# Patient Record
Sex: MALE | Race: ASIAN | NOT HISPANIC OR LATINO | Employment: FULL TIME | URBAN - METROPOLITAN AREA
[De-identification: names, ages, dates, MRNs, and addresses within clinical notes are randomized per-mention and may not be internally consistent; named-entity substitution may affect disease eponyms.]

---

## 2017-10-03 ENCOUNTER — OFFICE VISIT - HEALTHEAST (OUTPATIENT)
Dept: FAMILY MEDICINE | Facility: CLINIC | Age: 22
End: 2017-10-03

## 2017-10-03 DIAGNOSIS — L20.9 ATOPIC DERMATITIS: ICD-10-CM

## 2019-06-05 ENCOUNTER — OFFICE VISIT - HEALTHEAST (OUTPATIENT)
Dept: FAMILY MEDICINE | Facility: CLINIC | Age: 24
End: 2019-06-05

## 2019-06-05 DIAGNOSIS — H01.133 ECZEMA OF RIGHT EYELID: ICD-10-CM

## 2019-06-05 DIAGNOSIS — L84 CALLUS OF FOOT: ICD-10-CM

## 2019-06-05 DIAGNOSIS — Z71.85 IMMUNIZATION COUNSELING: ICD-10-CM

## 2019-06-05 RX ORDER — HYDROCORTISONE 25 MG/G
OINTMENT TOPICAL
Qty: 30 G | Refills: 0 | Status: SHIPPED | OUTPATIENT
Start: 2019-06-05

## 2019-06-05 ASSESSMENT — MIFFLIN-ST. JEOR: SCORE: 1840.4

## 2020-12-17 ENCOUNTER — OFFICE VISIT - HEALTHEAST (OUTPATIENT)
Dept: FAMILY MEDICINE | Facility: CLINIC | Age: 25
End: 2020-12-17

## 2020-12-17 DIAGNOSIS — Z13.1 SCREENING FOR DIABETES MELLITUS: ICD-10-CM

## 2020-12-17 DIAGNOSIS — Z13.220 SCREENING FOR LIPID DISORDERS: ICD-10-CM

## 2020-12-17 DIAGNOSIS — Z00.00 WELL ADULT EXAM: ICD-10-CM

## 2020-12-17 DIAGNOSIS — Z11.1 SCREENING EXAMINATION FOR PULMONARY TUBERCULOSIS: ICD-10-CM

## 2020-12-17 DIAGNOSIS — Z13.21 ENCOUNTER FOR VITAMIN DEFICIENCY SCREENING: ICD-10-CM

## 2020-12-17 DIAGNOSIS — Z13.0 SCREENING FOR DEFICIENCY ANEMIA: ICD-10-CM

## 2020-12-17 DIAGNOSIS — Z20.9 EXPOSURE TO POTENTIAL INFECTION: ICD-10-CM

## 2020-12-17 DIAGNOSIS — Z01.84 IMMUNITY STATUS TESTING: ICD-10-CM

## 2020-12-17 DIAGNOSIS — Z23 NEED FOR VACCINATION: ICD-10-CM

## 2020-12-17 DIAGNOSIS — J02.9 SORE THROAT: ICD-10-CM

## 2020-12-17 DIAGNOSIS — R09.81 NASAL CONGESTION: ICD-10-CM

## 2020-12-17 LAB
DEPRECATED S PYO AG THROAT QL EIA: NORMAL
FLUAV AG SPEC QL IA: NORMAL
FLUBV AG SPEC QL IA: NORMAL

## 2020-12-17 ASSESSMENT — MIFFLIN-ST. JEOR: SCORE: 1859.68

## 2020-12-18 ENCOUNTER — COMMUNICATION - HEALTHEAST (OUTPATIENT)
Dept: INTERNAL MEDICINE | Facility: CLINIC | Age: 25
End: 2020-12-18

## 2020-12-18 LAB — GROUP A STREP BY PCR: NORMAL

## 2020-12-21 ENCOUNTER — COMMUNICATION - HEALTHEAST (OUTPATIENT)
Dept: INTERNAL MEDICINE | Facility: CLINIC | Age: 25
End: 2020-12-21

## 2021-02-17 ENCOUNTER — AMBULATORY - HEALTHEAST (OUTPATIENT)
Dept: NURSING | Facility: CLINIC | Age: 26
End: 2021-02-17

## 2021-02-17 ENCOUNTER — AMBULATORY - HEALTHEAST (OUTPATIENT)
Dept: LAB | Facility: CLINIC | Age: 26
End: 2021-02-17

## 2021-02-17 DIAGNOSIS — Z01.84 IMMUNITY STATUS TESTING: ICD-10-CM

## 2021-02-17 DIAGNOSIS — Z13.21 ENCOUNTER FOR VITAMIN DEFICIENCY SCREENING: ICD-10-CM

## 2021-02-17 DIAGNOSIS — Z11.1 SCREENING EXAMINATION FOR PULMONARY TUBERCULOSIS: ICD-10-CM

## 2021-02-17 DIAGNOSIS — Z13.220 SCREENING FOR LIPID DISORDERS: ICD-10-CM

## 2021-02-17 DIAGNOSIS — Z23 NEED FOR VACCINATION: ICD-10-CM

## 2021-02-17 DIAGNOSIS — Z20.9 EXPOSURE TO POTENTIAL INFECTION: ICD-10-CM

## 2021-02-17 DIAGNOSIS — Z00.00 WELL ADULT EXAM: ICD-10-CM

## 2021-02-17 LAB
ALBUMIN SERPL-MCNC: 4.3 G/DL (ref 3.5–5)
ALP SERPL-CCNC: 80 U/L (ref 45–120)
ALT SERPL W P-5'-P-CCNC: 19 U/L (ref 0–45)
ANION GAP SERPL CALCULATED.3IONS-SCNC: 8 MMOL/L (ref 5–18)
AST SERPL W P-5'-P-CCNC: 19 U/L (ref 0–40)
BILIRUB SERPL-MCNC: 0.5 MG/DL (ref 0–1)
BUN SERPL-MCNC: 20 MG/DL (ref 8–22)
CALCIUM SERPL-MCNC: 9 MG/DL (ref 8.5–10.5)
CHLORIDE BLD-SCNC: 105 MMOL/L (ref 98–107)
CHOLEST SERPL-MCNC: 132 MG/DL
CO2 SERPL-SCNC: 26 MMOL/L (ref 22–31)
CREAT SERPL-MCNC: 0.99 MG/DL (ref 0.7–1.3)
ERYTHROCYTE [DISTWIDTH] IN BLOOD BY AUTOMATED COUNT: 13.7 % (ref 11–14.5)
FASTING STATUS PATIENT QL REPORTED: NORMAL
GFR SERPL CREATININE-BSD FRML MDRD: >60 ML/MIN/1.73M2
GLUCOSE BLD-MCNC: 98 MG/DL (ref 70–125)
HCT VFR BLD AUTO: 46.3 % (ref 40–54)
HDLC SERPL-MCNC: 43 MG/DL
HGB BLD-MCNC: 14.7 G/DL (ref 14–18)
LDLC SERPL CALC-MCNC: 71 MG/DL
MCH RBC QN AUTO: 25.2 PG (ref 27–34)
MCHC RBC AUTO-ENTMCNC: 31.7 G/DL (ref 32–36)
MCV RBC AUTO: 79 FL (ref 80–100)
PLATELET # BLD AUTO: 232 THOU/UL (ref 140–440)
PMV BLD AUTO: 9.4 FL (ref 7–10)
POTASSIUM BLD-SCNC: 4.1 MMOL/L (ref 3.5–5)
PROT SERPL-MCNC: 7.7 G/DL (ref 6–8)
RBC # BLD AUTO: 5.84 MILL/UL (ref 4.4–6.2)
SODIUM SERPL-SCNC: 139 MMOL/L (ref 136–145)
TRIGL SERPL-MCNC: 91 MG/DL
WBC: 5.1 THOU/UL (ref 4–11)

## 2021-02-18 LAB
25(OH)D3 SERPL-MCNC: 16.3 NG/ML (ref 30–80)
HEPATITIS B SURFACE ANTIBODY LHE- HISTORICAL: POSITIVE

## 2021-02-19 LAB
GAMMA INTERFERON BACKGROUND BLD IA-ACNC: 0.06 IU/ML
M TB IFN-G BLD-IMP: NEGATIVE
MITOGEN IGNF BCKGRD COR BLD-ACNC: -0.01 IU/ML
MITOGEN IGNF BCKGRD COR BLD-ACNC: -0.01 IU/ML
QTF INTERPRETATION: NORMAL
QTF MITOGEN - NIL: 7.34 IU/ML
VZV IGG SER QL IA: POSITIVE

## 2021-02-23 ENCOUNTER — COMMUNICATION - HEALTHEAST (OUTPATIENT)
Dept: SCHEDULING | Facility: CLINIC | Age: 26
End: 2021-02-23

## 2021-02-27 ENCOUNTER — COMMUNICATION - HEALTHEAST (OUTPATIENT)
Dept: FAMILY MEDICINE | Facility: CLINIC | Age: 26
End: 2021-02-27

## 2021-03-11 ENCOUNTER — COMMUNICATION - HEALTHEAST (OUTPATIENT)
Dept: INTERNAL MEDICINE | Facility: CLINIC | Age: 26
End: 2021-03-11

## 2021-03-12 ENCOUNTER — AMBULATORY - HEALTHEAST (OUTPATIENT)
Dept: NURSING | Facility: CLINIC | Age: 26
End: 2021-03-12

## 2021-03-19 ENCOUNTER — AMBULATORY - HEALTHEAST (OUTPATIENT)
Dept: NURSING | Facility: CLINIC | Age: 26
End: 2021-03-19

## 2021-05-29 NOTE — PROGRESS NOTES
"Assessment & Plan   1. Eczema of right eyelid  Well defined erythematous patch of lower lid that appears consistent with eczema.  H/o eczema though has not previously experienced symptoms on the eyelids.  Will treat with short course of topical corticosteroid and advised on application to avoid contact with eye. If no improvement in two weeks or if he finds that he is experiencing recurrent flares of eczema on the eyelids would consider calcineurin inhibitor.    - hydrocortisone 2.5 % ointment; Apply twice daily for up to weeks  Dispense: 30 g; Refill: 0    2. Callus of foot  Appearance consistent with calluses.  Likely from rubbing on tops of shoes. Recommended soaking, exfoliating, thick emollient daily.     3. Immunization counseling  Update HPV, advised to return in six months for #3.  Hep A #2 to finish this series.   - HPV vaccine 9 valent 3 dose IM  - Hepatitis A adult 2 dose IM (19 yr & older)    Indy Banerjee CNP    Subjective   Chief Complaint:  Eczema (under R eye and bilateral feet x 3 years )    HPI:   Jan Parish is a 23 y.o. male who presents for skin concern.      R eye: He states redness and roughness began about one month ago.  Tried using Vaseline though it continued to enlarge in size, now mildly swollen.  Not pruritic. Does touch it frequently.  No drainage.  H/o eczema on arms, never on face.     Feet: Bilateral great toes with thickened skin on dorsal aspect near PIP joint.       Allergies:  has No Known Allergies.    SH/FH:  Social History and Family History reviewed and updated.   Tobacco Status:  He  reports that he has never smoked. He has never used smokeless tobacco.    Review of Systems:  A complete head to toe ROS is negative unless otherwise noted in HPI    Objective     Vitals:    06/05/19 1133   BP: 94/60   Patient Site: Left Arm   Patient Position: Sitting   Cuff Size: Adult Large   Pulse: 64   Weight: 193 lb 12 oz (87.9 kg)   Height: 5' 7.5\" (1.715 m)       Physical " Exam:  GENERAL: Alert, well-appearing male .   SKIN: Right lower eyelid with 5mm x 3mm well defined erythematous patch. Fine scaling. Slightly edematous.  Nontender to palpation.  No surrounding erythema.  Conjunctiva clear. EOMs intact. PERRLA.   Bilateral great toes with 1cm plaques of thickened skin on dorsal aspects.  No flaking, erythema, drainage.  Fairly well defined.

## 2021-05-29 NOTE — PATIENT INSTRUCTIONS - HE
Recommendations from today's visit                                                       1. For the eye, we will use hydrocortisone 2.5% ointment twice daily for up to two weeks.  Then Vaseline to keep the area moisturized and prevent future outbreaks.  If you find that this is happening frequently, please let me know and we would consider another type of treatment.     2. The areas on your toes are calluses from contact with your shoes.  Soak in warm water for 10-15 minutes and then exfoliate with pumice stone or cliff board    3. We will give you your second HPV vaccine today.  You can return in six months for your third vaccine.     Next appointment: six months.     To reschedule your appointment, please call the clinic directly at [phone].   It was a pleasure seeing you today! I look forward to seeing you again.

## 2021-05-31 VITALS — WEIGHT: 173.25 LBS

## 2021-06-02 VITALS — BODY MASS INDEX: 29.36 KG/M2 | HEIGHT: 68 IN | WEIGHT: 193.75 LBS

## 2021-06-05 VITALS
DIASTOLIC BLOOD PRESSURE: 64 MMHG | OXYGEN SATURATION: 98 % | WEIGHT: 198 LBS | SYSTOLIC BLOOD PRESSURE: 108 MMHG | HEIGHT: 68 IN | HEART RATE: 94 BPM | BODY MASS INDEX: 30.01 KG/M2

## 2021-06-13 NOTE — TELEPHONE ENCOUNTER
----- Message from Moe Mercedes MD sent at 12/18/2020  5:37 PM CST -----  Inform [place]   Strep and Flu Normal  DanL

## 2021-06-13 NOTE — PROGRESS NOTES
Assessment & Plan   1. Atopic dermatitis: Appearance consistent with eczematous rash, KOH negative.  Discussed hypopigmentation is likely postinflammatory reaction.  Will treat topically with Lidex cream, advised on limiting use to this area.   - KOH Prep  - fluocinonide (LIDEX) 0.05 % cream; Use twice daily on affected area for up to two weeks  Dispense: 15 g; Refill: 0    Indy Banerjee CNP    Subjective   Chief Complaint:  Rash (left elbow rash, pain x1 year)    HPI:   Jan Parish is a 22 y.o. male who presents for evaluation of rash on left elbow.  He is a new patient to the clinic. No significant PMH.     He complains of rash on his left elbow that has been present for over a year.  Does improve and worsen at times.  Pruritic. Not painful.  Has noted hypopigmentation over the past six months.  No history of previous skin conditions. No lesions elsewhere on body.  No known exposures.  He uses a daily lotion, unsure of type.     PMH:   There is no problem list on file for this patient.      No past medical history on file.    Current Medications:   No current outpatient prescriptions on file prior to visit.     No current facility-administered medications on file prior to visit.        Allergies:  has No Known Allergies.    SH/FH:  Social History and Family History reviewed and updated.   Tobacco Status:  He  reports that he has never smoked. He has never used smokeless tobacco.    Review of Systems:  A complete head to toe ROS is negative unless otherwise noted in HPI    Objective     Vitals:    10/03/17 1333   BP: 108/62   Patient Site: Right Arm   Patient Position: Sitting   Cuff Size: Adult Regular   Pulse: 70   Weight: 173 lb 4 oz (78.6 kg)     Wt Readings from Last 3 Encounters:   10/03/17 173 lb 4 oz (78.6 kg)       Physical Exam:  GENERAL: Alert, well-appearing male  SKIN: Extensor surface of left elbow with 3cm round area of hypopigmentation.  Mildly erythematous, maculopapular, excoriations present. No  drainage or crusting. No lesions elsewhere on body    Labs:    No results found for this or any previous visit (from the past 168 hour(s)).

## 2021-06-13 NOTE — PATIENT INSTRUCTIONS - HE
Great to see you / meet you today Harmans!    Say isaac to your family!!     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    Sonu

## 2021-06-13 NOTE — TELEPHONE ENCOUNTER
Forms Request  Name of form/paperwork: Other:  [employer] Physical Examination Form  Have you been seen for this request: Yes:  12/17/2020  Do we have the form: Yes- Pt dropped the form off - placed in Dr. Mercedes's bin at   When is form needed by: as soon as feasible   How would you like the form returned:   Patient Notified form requests are processed in 3-5 business days: Yes    Okay to leave a detailed message? Yes

## 2021-06-13 NOTE — PROGRESS NOTES
ASSESSMENT & PLAN    Jan is here for physical examination  Going to nursing school  Needs screening exams  I reviewed his immunizations he has not had MMR  He has no documented to varicella    Check immune status for varicella and hepatitis B    He will receive MMR we will repeat this in 1 month to 2 months          No problem-specific Assessment & Plan notes found for this encounter.      Jan was seen today for annual exam.    Diagnoses and all orders for this visit:    Well adult exam    Need for vaccination  -     Tdap vaccine,  8yo or older,  IM    Nasal congestion  -     Influenza A/B Rapid Test    Sore throat  -     Rapid Strep A Screen-Throat    Exposure to potential infection  -     COVID-19 Virus (Coronavirus) Antibody & Titer Reflex    Screening for lipid disorders  -     Lipid Cascade RANDOM    Encounter for vitamin deficiency screening  -     Vitamin D, Total (25-Hydroxy)    Screening for diabetes mellitus  -     Comprehensive Metabolic Panel    Screening for deficiency anemia  -     HM2(CBC w/o Differential)    Screening examination for pulmonary tuberculosis  -     QTF-Mycobacterium tuberculosis by QuantiFERON-TB Gold Plus    Immunity status testing  -     Varicella Zoster Antibody, IgG  -     Hepatitis B Surface Antibody (Anti-HBs) Vaccine Check    Other orders  -     MMR vaccine subcutaneous  -     MMR vaccine subcutaneous; Future        Patient Instructions   Great to see you / meet you today Jan!    Say isaac to your family!!     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    Sonu      Return in about 2 months (around 2/17/2021).       Little interest or pleasure in doing things: Not at all  Feeling down, depressed, or hopeless: Not at all    CHIEF COMPLAINT: Jan Parish had concerns including Annual Exam (pt not fasting).    [place]: 1.............. SUBJECTIVE:  Jan Parish is a 25 y.o. male had concerns including Annual Exam (pt not fasting).    1. Well adult exam    2. Need for vaccination    3. Nasal congestion    4. Sore throat    5. Exposure to potential infection    6. Screening for lipid disorders    7. Encounter for vitamin deficiency screening    8. Screening for diabetes mellitus    9. Screening for deficiency anemia    10. Screening examination for pulmonary tuberculosis    11. Immunity status testing          No Known Allergies                      SOCIAL: He  reports that he has never smoked. He has never used smokeless tobacco. He reports that he does not drink alcohol or use drugs.    REVIEW OF SYSTEMS:   Family history not pertinent to chief complaint or presenting problem    Review of systems otherwise negative as requested from patient, except   Those positive ROS outlined and discussed in [place].      VITALS:  Vitals:    12/17/20 1732   BP: 108/64   Pulse: 94   SpO2: 98%   Weight: 198 lb (89.8 kg)   Height: 5' 7.5\" (1.715 m)     Wt Readings from Last 3 Encounters:   12/17/20 198 lb (89.8 kg)   06/05/19 193 lb 12 oz (87.9 kg)   10/03/17 173 lb 4 oz (78.6 kg)     Body mass index is 30.55 kg/m .    Physical Exam:      Physical:  General Appearance: Healthy-appearingy.   Head:  No deformity  Eyes: Sclerae white, pupils equal and reactive, extra ocular movements intact   Ears: Well-positioned, well-formed pinnae; TM pearly white, translucent, no bulging   Nose: Clear, normal mucosa no drainage or crusting  Throat: " Lips, tongue, and mucosa are moist, pink and intact; tongue no thrush oral pharynx no injection or lesions  Neck: Supple, symmetric ROM no nodes   No carotid Buits  Chest: Lungs clear to auscultation, no retractions  Heart: Regular rate & rhythm, S1 S2, no murmur  Abdomen: Soft, non-tender, no masses; umbilical area normal   Pulses: Equal femoral pulses  : No hernia palpable   Extremities: Well-perfused, warm and dry, No Edema  Palpable Pulses Bilateral  Neuro: Easily aroused good tone NO tremor   Skin  No Rash dry skin  Hint of acanthosis of the neck           I spent 40 minutes with this patient.  This includes pre-visit, intra-visit and post visit work an evaluation with regards to [place] was seen today for annual exam.    Diagnoses and all orders for this visit:    Well adult exam    Need for vaccination  -     Tdap vaccine,  6yo or older,  IM    Nasal congestion  -     Influenza A/B Rapid Test    Sore throat  -     Rapid Strep A Screen-Throat    Exposure to potential infection  -     COVID-19 Virus (Coronavirus) Antibody & Titer Reflex    Screening for lipid disorders  -     Lipid Cascade RANDOM    Encounter for vitamin deficiency screening  -     Vitamin D, Total (25-Hydroxy)    Screening for diabetes mellitus  -     Comprehensive Metabolic Panel    Screening for deficiency anemia  -     HM2(CBC w/o Differential)    Screening examination for pulmonary tuberculosis  -     QTF-Mycobacterium tuberculosis by QuantiFERON-TB Gold Plus    Immunity status testing  -     Varicella Zoster Antibody, IgG  -     Hepatitis B Surface Antibody (Anti-HBs) Vaccine Check    Other orders  -     MMR vaccine subcutaneous  -     MMR vaccine subcutaneous; Future        Moe Mercedes MD  Family [facility] [address]  [phone]

## 2021-06-13 NOTE — TELEPHONE ENCOUNTER
Second attempt:    Left message to call back for: Patient   Information to relay to patient:  Inform [place]   Strep and Flu Normal     GJ/RMA

## 2021-06-14 NOTE — TELEPHONE ENCOUNTER
Reason for Call:  Other Form     Detailed comments: Pt calling to check and see if forms were received via fax. Per notes below, doesn't not seem like the clinic did. I asked him which # was the form faxed to and he said [phone]. Not sure where that goes but I gave him [facility] fax # [phone]. Asked his to have the school nurse fax it to that # instead and CMA's should get it then. Please call him to confirm once received.    Phone Number Patient can be reached at:   Cell number on file:    Telephone Information:   Mobile [phone]       Best Time: anytime    Can we leave a detailed message on this number?: Yes    Call taken on 1/5/2021 at 11:51 AM by Quentin Patton

## 2021-06-14 NOTE — TELEPHONE ENCOUNTER
Vandana spoke with him, we have not received this form\    Nicole Hall CMA ([facility])

## 2021-06-14 NOTE — TELEPHONE ENCOUNTER
Patient Returning Call  Reason for call:  Patient calling back for test results  Information relayed to patient:  Message below  Patient has additional questions:  No  If YES, what are your questions/concerns:  n/a  Okay to leave a detailed message?: No call back needed

## 2021-06-14 NOTE — TELEPHONE ENCOUNTER
Checked with [facility], that is the  fax number, but they have not seen it    Nicole Hall CMA ([facility])

## 2021-06-14 NOTE — TELEPHONE ENCOUNTER
Form signed and faxed back.  I called and informed him of this    Nicole Hall CMA ([facility])

## 2021-06-15 NOTE — TELEPHONE ENCOUNTER
Pt needs a second varicella shot for school, first dose was 2010 so school wants him to show proof of second vaccine.

## 2021-06-21 NOTE — LETTER
Letter by Moe Mercedes MD at      Author: Moe Mercedes MD Service: -- Author Type: --    Filed:  Encounter Date: 2/27/2021 Status: (Other)         Jan Parish  [address]  [place] [address]             February 27, 2021         Dear Mr. Parish,    Below are the results from your recent visit:    Resulted Orders   COVID-19 Virus (Coronavirus) Antibody & Titer Reflex   Result Value Ref Range    COVID-19 Antibody Screen Positive       Comment:      Antibodies to COVID-19 detected, which may be due to COVID-19 vaccination, or  past or current COVID-19 infection.    COVID-19 IgG Titer 1:3200       Comment:      Qualitative screen for IgG, IgM and IgA antibodies to COVID-19 (SARS-CoV-2)  spike receptor binding domain (RBD) protein, with semi-quantitative measurement  of IgG COVID-19 spike RBD antibodies by endpoint titer. COVID-19 spike RBD  antibodies may be elevated due to vaccination, or a past or current COVID-19  infection.  The qualitative screen for IgG, IgM and IgA COVID-19 spike RBD antibodies is  performed on the [employer]ki, and this test is approved by the FDA under the  Emergency Use Authorization (EUA).  The IgG titer test was developed and its performance characteristics determined  by the [facility],  which is regulated under CLIA as qualified to perform high-complexity testing.  The IgG titer test has not been reviewed by the FDA. Negative results do not  rule out COVID-19 infection.  Results from antibody testing should not be used as the sole basis to diagnose  or exclude SARS-CoV-2 infection or to inform infection  status. COVID-19 PCR  test should be ordered if current infection is suspected. False positive  results may occur in rare cases due to cross-reacting antibodies.  Testing performed by [facility], [facility], [address], [address], [place] [address]    QTF-Mycobacterium tuberculosis by QuantiFERON-TB Gold Plus   Result Value Ref Range    QTF RESULT Negative Negative    QTF INTREPRETATION       No interferon-gamma response to M. tuberculosis antigens was detected.  Infecton with M. tuberculosis is unlikely.  A negative result alone does not exclude infection with M. tuberculosis    QTF NIL 0.06 IU/mL    QTF ANTIGEN TB1-NIL -0.01 IU/mL    QTF ANTIGEN TB2 - NIL -0.01 IU/mL    QTF MITOGEN-NIL 7.34 IU/mL   Hepatitis B Surface Antibody (Anti-HBs)   Result Value Ref Range    Hep B Surface Antibody Positive (!) Negative   Vitamin D, Total (25-Hydroxy)   Result Value Ref Range    Vitamin D, Total (25-Hydroxy) 16.3 (L) 30.0 - 80.0 ng/mL    Narrative    Deficiency <10.0 ng/mL  Insufficiency 10.0-29.9 ng/mL  Sufficiency 30.0-80.0 ng/mL  Toxicity (possible) >100.0 ng/mL   Comprehensive Metabolic Panel   Result Value Ref Range    Sodium 139 136 - 145 mmol/L    Potassium 4.1 3.5 - 5.0 mmol/L    Chloride 105 98 - 107 mmol/L    CO2 26 22 - 31 mmol/L    Anion Gap, Calculation 8 5 - 18 mmol/L    Glucose 98 70 - 125 mg/dL    BUN 20 8 - 22 mg/dL    Creatinine 0.99 0.70 - 1.30 mg/dL    GFR MDRD Af Amer >60 >60 mL/min/1.73m2    GFR MDRD Non Af Amer >60 >60 mL/min/1.73m2    Bilirubin, Total 0.5 0.0 - 1.0 mg/dL    Calcium 9.0 8.5 - 10.5 mg/dL    Protein, Total 7.7 6.0 - 8.0 g/dL    Albumin 4.3 3.5 - 5.0 g/dL    Alkaline Phosphatase 80 45 - 120 U/L    AST 19 0 - 40 U/L    ALT 19 0 - 45 U/L    Narrative    Fasting Glucose reference range is 70-99 mg/dL per  American Diabetes Association (ADA) guidelines.   HM2(CBC w/o Differential)   Result Value Ref Range    WBC 5.1 4.0 - 11.0 thou/uL    RBC 5.84 4.40 - 6.20 mill/uL    Hemoglobin 14.7 14.0 - 18.0 g/dL    Hematocrit 46.3 40.0 - 54.0 %    MCV 79 (L) 80 - 100 fL    MCH 25.2 (L) 27.0 - 34.0 pg    MCHC 31.7 (L) 32.0 - 36.0 g/dL    RDW 13.7 11.0 - 14.5 %    Platelets 232 140 - 440 thou/uL    MPV 9.4 7.0 - 10.0 fL   Varicella Zoster Antibody,  IgG   Result Value Ref Range    Varicella Zoster Antibody IgG Positive     Narrative    Assay interference due to circulating antibodies against HIV, Hepatitis A, Hepatitis B, Hepatitis C, HAMA and Rheumatoid Factor has not been evaluated.    The assay performance in detecting antibodies to Varicella Zoster in individuals vaccinated with the FDA-licensed VZV vaccine has not been established.    Negative: Absence of detectable Varicella Zoster IgG antibodies. A negative result indicates no detectable VZV antibody, but does not rule out acute infection. It should be noted that the test usually scores negative in infected patients during the incubation period and the early stages of infection.    Equivocal: Suggest recollection.    Positive: Presence of detectable Varicella Zoster IgG antibodies. A positive result generally indicates exposure to the pathogen or administration of specific immune-globulins, but it is no indication of active infection or stage of disease.     Lipid Cascade   Result Value Ref Range    Cholesterol 132 <=199 mg/dL    Triglycerides 91 <=149 mg/dL    HDL Cholesterol 43 >=40 mg/dL    LDL Calculated 71 <=129 mg/dL    Patient Fasting > 8hrs? Unknown        Metter     All good my friend except LOW Vitamin D  Take 2000 international unit(s) with Fatty food daily    Immune to:  Covid 19 shows immunity and prior infection  Immune to Hepatitis B  Immune to Chicken Pox = Varicella Zoster     Cholesterol is excellent!!      Please call with questions or contact us using [employer].    Sincerely,        Electronically signed by Moe Mercedes MD

## 2021-12-20 ENCOUNTER — ALLIED HEALTH/NURSE VISIT (OUTPATIENT)
Dept: FAMILY MEDICINE | Facility: CLINIC | Age: 26
End: 2021-12-20
Payer: COMMERCIAL

## 2021-12-20 DIAGNOSIS — Z23 NEED FOR VACCINATION: Primary | ICD-10-CM

## 2021-12-20 PROCEDURE — 99207 PR NO CHARGE NURSE ONLY: CPT

## 2021-12-20 PROCEDURE — 86580 TB INTRADERMAL TEST: CPT

## 2021-12-22 ENCOUNTER — ALLIED HEALTH/NURSE VISIT (OUTPATIENT)
Dept: FAMILY MEDICINE | Facility: CLINIC | Age: 26
End: 2021-12-22
Payer: COMMERCIAL

## 2021-12-22 DIAGNOSIS — Z23 ENCOUNTER FOR IMMUNIZATION: Primary | ICD-10-CM

## 2021-12-22 PROCEDURE — 99207 PR NO CHARGE NURSE ONLY: CPT

## 2021-12-22 NOTE — PROGRESS NOTES
Patient is here today for a Mantoux (TST) test results.    Did patient return to clinic 48-72 hours from Mantoux (TST) placement: Yes -     Results:  0 mm induration      Induration Size? Induration <5mm - Enter results in Enter/Edit Activity. Route results to ordering provider.     Patient needs form signed? No    Patient reports having previously had the BCG Vaccine: No    Does patient need a two step? No

## 2022-02-17 PROBLEM — U07.1 CLINICAL DIAGNOSIS OF COVID-19: Status: ACTIVE | Noted: 2021-02-27

## 2025-07-08 ENCOUNTER — OFFICE VISIT (OUTPATIENT)
Dept: FAMILY MEDICINE | Facility: CLINIC | Age: 30
End: 2025-07-08
Payer: COMMERCIAL

## 2025-07-08 VITALS
BODY MASS INDEX: 33.75 KG/M2 | DIASTOLIC BLOOD PRESSURE: 76 MMHG | RESPIRATION RATE: 17 BRPM | WEIGHT: 222.7 LBS | HEART RATE: 92 BPM | SYSTOLIC BLOOD PRESSURE: 108 MMHG | OXYGEN SATURATION: 98 % | HEIGHT: 68 IN | TEMPERATURE: 99.1 F

## 2025-07-08 DIAGNOSIS — Z11.59 NEED FOR HEPATITIS C SCREENING TEST: ICD-10-CM

## 2025-07-08 DIAGNOSIS — Z11.4 SCREENING FOR HIV (HUMAN IMMUNODEFICIENCY VIRUS): ICD-10-CM

## 2025-07-08 DIAGNOSIS — R20.2 PARESTHESIAS: ICD-10-CM

## 2025-07-08 DIAGNOSIS — Z00.00 ROUTINE GENERAL MEDICAL EXAMINATION AT A HEALTH CARE FACILITY: Primary | ICD-10-CM

## 2025-07-08 DIAGNOSIS — Z83.3 FAMILY HISTORY OF DIABETES MELLITUS: ICD-10-CM

## 2025-07-08 LAB
ERYTHROCYTE [DISTWIDTH] IN BLOOD BY AUTOMATED COUNT: 13.6 % (ref 10–15)
EST. AVERAGE GLUCOSE BLD GHB EST-MCNC: 108 MG/DL
HBA1C MFR BLD: 5.4 % (ref 0–5.6)
HCT VFR BLD AUTO: 44.7 % (ref 40–53)
HGB BLD-MCNC: 14.8 G/DL (ref 13.3–17.7)
MCH RBC QN AUTO: 25.1 PG (ref 26.5–33)
MCHC RBC AUTO-ENTMCNC: 33.1 G/DL (ref 31.5–36.5)
MCV RBC AUTO: 76 FL (ref 78–100)
PLATELET # BLD AUTO: 228 10E3/UL (ref 150–450)
RBC # BLD AUTO: 5.89 10E6/UL (ref 4.4–5.9)
WBC # BLD AUTO: 7.4 10E3/UL (ref 4–11)

## 2025-07-08 PROCEDURE — 3044F HG A1C LEVEL LT 7.0%: CPT | Performed by: NURSE PRACTITIONER

## 2025-07-08 PROCEDURE — 99385 PREV VISIT NEW AGE 18-39: CPT | Mod: 25 | Performed by: NURSE PRACTITIONER

## 2025-07-08 PROCEDURE — 3078F DIAST BP <80 MM HG: CPT | Performed by: NURSE PRACTITIONER

## 2025-07-08 PROCEDURE — 36415 COLL VENOUS BLD VENIPUNCTURE: CPT | Performed by: NURSE PRACTITIONER

## 2025-07-08 PROCEDURE — 84443 ASSAY THYROID STIM HORMONE: CPT | Performed by: NURSE PRACTITIONER

## 2025-07-08 PROCEDURE — 99213 OFFICE O/P EST LOW 20 MIN: CPT | Mod: 25 | Performed by: NURSE PRACTITIONER

## 2025-07-08 PROCEDURE — 87389 HIV-1 AG W/HIV-1&-2 AB AG IA: CPT | Performed by: NURSE PRACTITIONER

## 2025-07-08 PROCEDURE — 86140 C-REACTIVE PROTEIN: CPT | Performed by: NURSE PRACTITIONER

## 2025-07-08 PROCEDURE — 90651 9VHPV VACCINE 2/3 DOSE IM: CPT | Performed by: NURSE PRACTITIONER

## 2025-07-08 PROCEDURE — 86803 HEPATITIS C AB TEST: CPT | Performed by: NURSE PRACTITIONER

## 2025-07-08 PROCEDURE — 85027 COMPLETE CBC AUTOMATED: CPT | Performed by: NURSE PRACTITIONER

## 2025-07-08 PROCEDURE — 83735 ASSAY OF MAGNESIUM: CPT | Performed by: NURSE PRACTITIONER

## 2025-07-08 PROCEDURE — 82607 VITAMIN B-12: CPT | Performed by: NURSE PRACTITIONER

## 2025-07-08 PROCEDURE — 90471 IMMUNIZATION ADMIN: CPT | Performed by: NURSE PRACTITIONER

## 2025-07-08 PROCEDURE — 83036 HEMOGLOBIN GLYCOSYLATED A1C: CPT | Performed by: NURSE PRACTITIONER

## 2025-07-08 PROCEDURE — 1126F AMNT PAIN NOTED NONE PRSNT: CPT | Performed by: NURSE PRACTITIONER

## 2025-07-08 PROCEDURE — 3074F SYST BP LT 130 MM HG: CPT | Performed by: NURSE PRACTITIONER

## 2025-07-08 PROCEDURE — 80053 COMPREHEN METABOLIC PANEL: CPT | Performed by: NURSE PRACTITIONER

## 2025-07-08 SDOH — HEALTH STABILITY: PHYSICAL HEALTH: ON AVERAGE, HOW MANY DAYS PER WEEK DO YOU ENGAGE IN MODERATE TO STRENUOUS EXERCISE (LIKE A BRISK WALK)?: 3 DAYS

## 2025-07-08 ASSESSMENT — ANXIETY QUESTIONNAIRES
1. FEELING NERVOUS, ANXIOUS, OR ON EDGE: NOT AT ALL
6. BECOMING EASILY ANNOYED OR IRRITABLE: NOT AT ALL
7. FEELING AFRAID AS IF SOMETHING AWFUL MIGHT HAPPEN: NOT AT ALL
GAD7 TOTAL SCORE: 0
7. FEELING AFRAID AS IF SOMETHING AWFUL MIGHT HAPPEN: NOT AT ALL
4. TROUBLE RELAXING: NOT AT ALL
2. NOT BEING ABLE TO STOP OR CONTROL WORRYING: NOT AT ALL
3. WORRYING TOO MUCH ABOUT DIFFERENT THINGS: NOT AT ALL
GAD7 TOTAL SCORE: 0
5. BEING SO RESTLESS THAT IT IS HARD TO SIT STILL: NOT AT ALL
8. IF YOU CHECKED OFF ANY PROBLEMS, HOW DIFFICULT HAVE THESE MADE IT FOR YOU TO DO YOUR WORK, TAKE CARE OF THINGS AT HOME, OR GET ALONG WITH OTHER PEOPLE?: NOT DIFFICULT AT ALL
IF YOU CHECKED OFF ANY PROBLEMS ON THIS QUESTIONNAIRE, HOW DIFFICULT HAVE THESE PROBLEMS MADE IT FOR YOU TO DO YOUR WORK, TAKE CARE OF THINGS AT HOME, OR GET ALONG WITH OTHER PEOPLE: NOT DIFFICULT AT ALL
GAD7 TOTAL SCORE: 0

## 2025-07-08 ASSESSMENT — PAIN SCALES - GENERAL: PAINLEVEL_OUTOF10: NO PAIN (0)

## 2025-07-08 ASSESSMENT — SOCIAL DETERMINANTS OF HEALTH (SDOH): HOW OFTEN DO YOU GET TOGETHER WITH FRIENDS OR RELATIVES?: ONCE A WEEK

## 2025-07-08 NOTE — PATIENT INSTRUCTIONS
Patient Education   Preventive Care Advice   This is general advice given by our system to help you stay healthy. However, your care team may have specific advice just for you. Please talk to your care team about your preventive care needs.  Nutrition  Eat 5 or more servings of fruits and vegetables each day.  Try wheat bread, brown rice and whole grain pasta (instead of white bread, rice, and pasta).  Get enough calcium and vitamin D. Check the label on foods and aim for 100% of the RDA (recommended daily allowance).  Lifestyle  Exercise at least 150 minutes each week  (30 minutes a day, 5 days a week).  Do muscle strengthening activities 2 days a week. These help control your weight and prevent disease.  No smoking.  Wear sunscreen to prevent skin cancer.  Have a dental exam and cleaning every 6 months.  Yearly exams  See your health care team every year to talk about:  Any changes in your health.  Any medicines your care team has prescribed.  Preventive care, family planning, and ways to prevent chronic diseases.  Shots (vaccines)   HPV shots (up to age 26), if you've never had them before.  Hepatitis B shots (up to age 59), if you've never had them before.  COVID-19 shot: Get this shot when it's due.  Flu shot: Get a flu shot every year.  Tetanus shot: Get a tetanus shot every 10 years.  Pneumococcal, hepatitis A, and RSV shots: Ask your care team if you need these based on your risk.  Shingles shot (for age 50 and up)  General health tests  Diabetes screening:  Starting at age 35, Get screened for diabetes at least every 3 years.  If you are younger than age 35, ask your care team if you should be screened for diabetes.  Cholesterol test: At age 39, start having a cholesterol test every 5 years, or more often if advised.  Bone density scan (DEXA): At age 50, ask your care team if you should have this scan for osteoporosis (brittle bones).  Hepatitis C: Get tested at least once in your life.  STIs (sexually  transmitted infections)  Before age 24: Ask your care team if you should be screened for STIs.  After age 24: Get screened for STIs if you're at risk. You are at risk for STIs (including HIV) if:  You are sexually active with more than one person.  You don't use condoms every time.  You or a partner was diagnosed with a sexually transmitted infection.  If you are at risk for HIV, ask about PrEP medicine to prevent HIV.  Get tested for HIV at least once in your life, whether you are at risk for HIV or not.  Cancer screening tests  Cervical cancer screening: If you have a cervix, begin getting regular cervical cancer screening tests starting at age 21.  Breast cancer scan (mammogram): If you've ever had breasts, begin having regular mammograms starting at age 40. This is a scan to check for breast cancer.  Colon cancer screening: It is important to start screening for colon cancer at age 45.  Have a colonoscopy test every 10 years (or more often if you're at risk) Or, ask your provider about stool tests like a FIT test every year or Cologuard test every 3 years.  To learn more about your testing options, visit:   .  For help making a decision, visit:   https://bit.ly/nm42611.  Prostate cancer screening test: If you have a prostate, ask your care team if a prostate cancer screening test (PSA) at age 55 is right for you.  Lung cancer screening: If you are a current or former smoker ages 50 to 80, ask your care team if ongoing lung cancer screenings are right for you.  For informational purposes only. Not to replace the advice of your health care provider. Copyright   2023 [employer]. All rights reserved. Clinically reviewed by the [facility] Transitions Program. [employer] 664642 - REV 01/24.

## 2025-07-08 NOTE — NURSING NOTE
Patient received HPV vaccine at today's appointment. Prior to immunization administration, verified patients identity using patient s name and date of birth. Please see Immunization Activity for additional information.     Screening Questionnaire for Adult Immunization    Are you sick today?   No   Do you have allergies to medications, food, a vaccine component or latex?   No   Have you ever had a serious reaction after receiving a vaccination?   No   Do you have a long-term health problem with heart, lung, kidney, or metabolic disease (e.g., diabetes), asthma, a blood disorder, no spleen, complement component deficiency, a cochlear implant, or a spinal fluid leak?  Are you on long-term aspirin therapy?   No   Do you have cancer, leukemia, HIV/AIDS, or any other immune system problem?   No   Do you have a parent, brother, or sister with an immune system problem?   No   In the past 3 months, have you taken medications that affect  your immune system, such as prednisone, other steroids, or anticancer drugs; drugs for the treatment of rheumatoid arthritis, Crohn s disease, or psoriasis; or have you had radiation treatments?   No   Have you had a seizure, or a brain or other nervous system problem?   No   During the past year, have you received a transfusion of blood or blood    products, or been given immune (gamma) globulin or antiviral drug?   No   For women: Are you pregnant or is there a chance you could become       pregnant during the next month?   No   Have you received any vaccinations in the past 4 weeks?   No     Immunization questionnaire answers were all negative.      Patient instructed to remain in clinic for 15 minutes afterwards, and to report any adverse reactions.     Screening performed by Monique Landa RN on 7/8/2025 at 3:08 PM.

## 2025-07-08 NOTE — PROGRESS NOTES
"Preventive Care Visit  [facility]  CLEMENCIA Edwards CNP, Family Medicine  Jul 8, 2025  {Provider  Link to [facility] :097696}    Assessment & Plan     Routine general medical examination at a health care facility  ***    Screening for HIV (human immunodeficiency virus)  ***  - HIV Antigen Antibody Combo    Need for hepatitis C screening test  ***  - Hepatitis C Screen Reflex to HCV RNA Quant and Genotype    Paresthesias  ***  - Hemoglobin A1c  - CBC with platelets  - TSH with free T4 reflex  - Comprehensive metabolic panel  - CRP, inflammation    {Patient advised of split billing (Optional):916401}    BMI  Estimated body mass index is 34.03 kg/m  as calculated from the following:    Height as of this encounter: 1.723 m (5' 7.84\").    Weight as of this encounter: 101 kg (222 lb 11.2 oz).   {Weight Management Plan needed for ACO:145319}    Counseling  Appropriate preventive services were addressed with this patient via screening, questionnaire, or discussion as appropriate for fall prevention, nutrition, physical activity, Tobacco-use cessation, social engagement, weight loss and cognition.  Checklist reviewing preventive services available has been given to the patient.  Reviewed patient's diet, addressing concerns and/or questions.   He is at risk for lack of exercise and has been provided with information to increase physical activity for the benefit of his well-being.   {MALE COUNSELING MESSAGES (Optional):960797::\"Reviewed preventive health counseling, as reflected in patient instructions\"}    {Follow-up (Optional):179195}    Tereso Montoya is a 29 year old, presenting for the following:  Establish Care and Physical (Would like labs - concerned about pre-diabetes. Father has type 2 DM /Pt has neuropathy in both r/l hand - ring and pinky finger. )        7/8/2025     2:24 PM   Additional Questions   Roomed by Natasha RIVERA    Concerned about pre-diabetes, dad has diabetes " type 2  Numbness and tingling on hands- ring and pinky finger that started in May-   Works overnight, and was drinking about 2 energy drinks a day- he stopped the energy drinks, and symptoms resolved for a week, but now symptoms returned. Symptoms occur after waking up from sleep.  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 No injury.        {MA/LPN/RN Pre-Provider Visit Orders- hCG/UA/Strep (Optional):272802}  {SUPERLIST (Optional):469849}  {additonal problems for provider to add (Optional):645870}  Advance Care Planning  {The storyboard will display whether the patient has ACP docs on file. Hover over the Code section in the storyboard to access the ACP documents. :536099}  Discussed advance care planning with patient; however, patient declined at this time.        7/8/2025   General Health   How would you rate your overall physical health? (!) FAIR   Feel stress (tense, anxious, or unable to sleep) Not at all         7/8/2025   Nutrition   Three or more servings of calcium each day? (!) NO   Diet: Regular (no restrictions)   How many servings of fruit and vegetables per day? (!) 2-3   How many sweetened beverages each day? 0-1         7/8/2025   Exercise   Days per week of moderate/strenous exercise 3 days         7/8/2025   Social Factors   Frequency of gathering with friends or relatives Once a week   Worry food won't last until get money to buy more No   Food not last or not have enough money for food? No   Do you have housing? (Housing is defined as stable permanent housing and does not include staying outside in a car, in a tent, in an abandoned building, in an overnight shelter, or couch-surfing.) No   Are you worried about losing your housing? No   Lack of transportation? No   Unable to get utilities (heat,electricity)? No   Want help with housing or utility concern? No   (!) HOUSING CONCERN PRESENT      7/8/2025   Dental   Dentist two  "times every year? Yes         Today's PHQ-2 Score:       7/8/2025     2:04 PM   PHQ-2 ( 1999 [employer])   Q1: Little interest or pleasure in doing things 0   Q2: Feeling down, depressed or hopeless 0   PHQ-2 Score 0    Q1: Little interest or pleasure in doing things Not at all   Q2: Feeling down, depressed or hopeless Not at all   PHQ-2 Score 0       Patient-reported           7/8/2025   Substance Use   Alcohol more than 3/day or more than 7/wk No   Do you use any other substances recreationally? No     Social History     Tobacco Use    Smoking status: Never    Smokeless tobacco: Never   Vaping Use    Vaping status: Never Used   Substance Use Topics    Alcohol use: No    Drug use: No     {Provider  If there are gaps in the social history shown above, please follow the link to update and then refresh the note Link to Social and Substance History :728423}        7/8/2025   One time HIV Screening   Previous HIV test? No         7/8/2025   STI Screening   New sexual partner(s) since last STI/HIV test? No         7/8/2025   Contraception/Family Planning   Questions about contraception or family planning No     {Provider  REQUIRED FOR AWV Use the storyboard to review patient history, after sections have been marked as reviewed, refresh note to capture documentation:353403}   Reviewed and updated as needed this visit by Provider         Fam Hx            {HISTORY OPTIONS (Optional):177674}    {ROS Picklists (Optional):436292}     Objective    Exam  /76 (BP Location: Left arm, Patient Position: Sitting, Cuff Size: Adult Large)   Pulse 92   Temp 99.1  F (37.3  C) (Temporal)   Resp 17   Ht 1.723 m (5' 7.84\")   Wt 101 kg (222 lb 11.2 oz)   SpO2 98%   BMI 34.03 kg/m     Estimated body mass index is 34.03 kg/m  as calculated from the following:    Height as of this encounter: 1.723 m (5' 7.84\").    Weight as of this encounter: 101 kg (222 lb 11.2 oz).    Physical Exam  {Exam Choices (Optional):387695}        Signed " Electronically by: CLEMENCIA Edwards CNP  {Email feedback regarding this note to [email or web address]   :486049}  Answers submitted by the patient for this visit:  Patient Health Questionnaire (G7) (Submitted on 7/8/2025)  CAIN 7 TOTAL SCORE: 0

## 2025-07-09 LAB
ALBUMIN SERPL BCG-MCNC: 4.3 G/DL (ref 3.5–5.2)
ALP SERPL-CCNC: 69 U/L (ref 40–150)
ALT SERPL W P-5'-P-CCNC: 45 U/L (ref 0–70)
ANION GAP SERPL CALCULATED.3IONS-SCNC: 9 MMOL/L (ref 7–15)
AST SERPL W P-5'-P-CCNC: 31 U/L (ref 0–45)
BILIRUB SERPL-MCNC: 0.4 MG/DL
BUN SERPL-MCNC: 18.7 MG/DL (ref 6–20)
CALCIUM SERPL-MCNC: 9.6 MG/DL (ref 8.8–10.4)
CHLORIDE SERPL-SCNC: 104 MMOL/L (ref 98–107)
CREAT SERPL-MCNC: 1.11 MG/DL (ref 0.67–1.17)
CRP SERPL-MCNC: <3 MG/L
EGFRCR SERPLBLD CKD-EPI 2021: >90 ML/MIN/1.73M2
GLUCOSE SERPL-MCNC: 107 MG/DL (ref 70–99)
HCO3 SERPL-SCNC: 26 MMOL/L (ref 22–29)
HCV AB SERPL QL IA: NONREACTIVE
HIV 1+2 AB+HIV1 P24 AG SERPL QL IA: NONREACTIVE
MAGNESIUM SERPL-MCNC: 2.3 MG/DL (ref 1.7–2.3)
POTASSIUM SERPL-SCNC: 4.1 MMOL/L (ref 3.4–5.3)
PROT SERPL-MCNC: 7.7 G/DL (ref 6.4–8.3)
SODIUM SERPL-SCNC: 139 MMOL/L (ref 135–145)
TSH SERPL DL<=0.005 MIU/L-ACNC: 1.17 UIU/ML (ref 0.3–4.2)
VIT B12 SERPL-MCNC: 609 PG/ML (ref 232–1245)

## 2025-07-14 PROBLEM — Z83.3 FAMILY HISTORY OF DIABETES MELLITUS: Status: ACTIVE | Noted: 2025-07-14

## 2025-07-14 PROBLEM — U07.1 CLINICAL DIAGNOSIS OF COVID-19: Status: RESOLVED | Noted: 2021-02-27 | Resolved: 2025-07-14
